# Patient Record
Sex: FEMALE | Race: WHITE
[De-identification: names, ages, dates, MRNs, and addresses within clinical notes are randomized per-mention and may not be internally consistent; named-entity substitution may affect disease eponyms.]

---

## 2017-02-06 ENCOUNTER — HOSPITAL ENCOUNTER (OUTPATIENT)
Dept: HOSPITAL 39 - GMAM | Age: 27
Discharge: HOME | End: 2017-02-06
Attending: FAMILY MEDICINE
Payer: COMMERCIAL

## 2017-02-06 DIAGNOSIS — F41.1: ICD-10-CM

## 2017-02-06 DIAGNOSIS — R53.83: Primary | ICD-10-CM

## 2020-03-09 ENCOUNTER — HOSPITAL ENCOUNTER (EMERGENCY)
Dept: HOSPITAL 39 - ER | Age: 30
Discharge: HOME | End: 2020-03-09
Payer: COMMERCIAL

## 2020-03-09 VITALS — DIASTOLIC BLOOD PRESSURE: 78 MMHG | SYSTOLIC BLOOD PRESSURE: 104 MMHG | TEMPERATURE: 97.6 F

## 2020-03-09 VITALS — OXYGEN SATURATION: 98 %

## 2020-03-09 DIAGNOSIS — Z79.899: ICD-10-CM

## 2020-03-09 DIAGNOSIS — Z87.440: ICD-10-CM

## 2020-03-09 DIAGNOSIS — R11.2: ICD-10-CM

## 2020-03-09 DIAGNOSIS — Z87.442: ICD-10-CM

## 2020-03-09 DIAGNOSIS — N13.2: Primary | ICD-10-CM

## 2020-03-09 DIAGNOSIS — D25.9: ICD-10-CM

## 2020-03-09 PROCEDURE — 83605 ASSAY OF LACTIC ACID: CPT

## 2020-03-09 PROCEDURE — 85025 COMPLETE CBC W/AUTO DIFF WBC: CPT

## 2020-03-09 PROCEDURE — 76830 TRANSVAGINAL US NON-OB: CPT

## 2020-03-09 PROCEDURE — 83690 ASSAY OF LIPASE: CPT

## 2020-03-09 PROCEDURE — 80076 HEPATIC FUNCTION PANEL: CPT

## 2020-03-09 PROCEDURE — 80048 BASIC METABOLIC PNL TOTAL CA: CPT

## 2020-03-09 PROCEDURE — 84703 CHORIONIC GONADOTROPIN ASSAY: CPT

## 2020-03-09 PROCEDURE — 82150 ASSAY OF AMYLASE: CPT

## 2020-03-09 PROCEDURE — 74176 CT ABD & PELVIS W/O CONTRAST: CPT

## 2020-03-09 PROCEDURE — 87040 BLOOD CULTURE FOR BACTERIA: CPT

## 2020-03-09 PROCEDURE — 36415 COLL VENOUS BLD VENIPUNCTURE: CPT

## 2020-03-09 PROCEDURE — 81001 URINALYSIS AUTO W/SCOPE: CPT

## 2020-03-09 NOTE — US
EXAM DESCRIPTION: 

Pelvis Transvaginal: Ultrasound.



CLINICAL HISTORY: 

30 years Female LLQ pain, pelvic calcifications seen on CT



COMPARISON: 

CT scan abdomen and pelvis without contrast on the same visit.

Right abdominal pain.



TECHNIQUE: 

Endovaginal scanning; Gray-scale and Doppler modes.



FINDINGS: 

Uterus 8.4 x 4.4 x 3.7 cm 71.2 mL.. Endometrial thickness 3.6 mm.

Myometrium heterogeneous. Hypoechoic mass with poorly defined

walls anterior myometrial wall subcapsular measuring 10 x 9 x 7

mm. Cyst at the second hyperechoic mass slightly more inferior

also subcapsular to the anterior uterine wall measuring 10 x 8 x

7 mm.. Uterus  not retroflexed. Cervix unremarkable. Cul-de-sac

minimal fluid.

Right ovary 2.7 x 1.8 x 1.5 cm 3.8 mL.. Normal color and waveform

Doppler vascularity. Small follicles but no cysts. No adnexal

mass or free fluid. 

Left ovary 2.3 x 1.8 x 1.2 cm 2.6 mL.. Normal waveform and color

Doppler vascularity. Multiple follicles but no cysts. No adnexal

mass or free fluid.



IMPRESSION: 

1. Uterus not enlarged with no endometrial thickening. At least 3

fibroids under 2 cm in greatest diameter. No endometrial

thickening or fluid. Cervix unremarkable. Minimal fluid in the

cul-de-sac.

2. Bilateral ovaries normal size with follicles but no dominant

cysts. No adnexal masses.



Electronically signed by:  Luis Chadwick MD  3/9/2020 2:02 PM CDT

Workstation: [id number]

## 2020-03-09 NOTE — ED.PDOC
History of Present Illness





- General


Time Seen by Provider: 03/09/20 09:10


Source: patient





- History of Present Illness


Initial Comments: 





29 yo female with PMH of kidney stones who presents with cc of left flank and 

LLQ pain.  Reports mild dull aching pains to Left flank for a couple days, 

acutely worsened this morning just PTA.  Now reports as sharp severe pain to 

Left flank which radiates to LLQ, constant, no known exacerbating factors, 

nothing taken at home for relief.  LMP was 2 weeks ago.  Denies any fevers, 

chills, diarrhea.  Reports nausea and NBNB emesis upon ED arrival.  Reports 

recent frequent urination but no burning or dysuria.  Has had hx of UTI's in the

past and kidney infections.





Allergies/Adverse Reactions: 


Allergies





NO KNOWN ALLERGY Allergy (Verified 03/09/20 09:47)


   





Home Medications: 


Ambulatory Orders





Norethindrone (Contraceptive) [Domonique] 0.35 mg PO DAILY #3 tab 06/18/14 


RX: Ferrous Sulfate [Feosol Tab] 325 mg PO DAILY #100 tab 06/18/14 


RX: HYDROcodone 5MG/APAP 325MG [Norco 5/325] 1 ea PO Q8H PRN #12 tab 06/18/14 


RX: Ibuprofen [Motrin] 600 mg PO Q8H PRN #22 tab 06/18/14 


Acetamin W/Cod #3 Tab [Tylenol w/CODEINE #3] 1 ea PO Q6H PRN 10 Days #20 tab 

03/09/20 


Ondansetron Odt [Zofran ODT] 8 mg PO Q8H PRN 5 Days #10 tab 03/09/20 


Tamsulosin [Flomax] 0.4 mg PO DAILY 7 Days #7 cap 03/09/20 











Review of Systems





- Review of Systems


Review of Systems: 





03/09/20 09:19


as per HPI





All other Systems: Reviewed and Negative





Past Medical History (General)





- Patient Medical History


Hx Seizures: No


Hx Stroke: No


Hx Dementia: No


Hx Asthma: No


Hx of COPD: No


Hx Cardiac Disorders: No


Hx Congestive Heart Failure: No


Hx Pacemaker: No


Hx Hypertension: No


Hx Thyroid Disease: No


Hx Diabetes: No


Hx Gastroesophageal Reflux: No


Hx Renal Disease: No


Hx Cancer: No


Hx of HIV: No


Hx Hepatitis C: No


Hx MRSA: No





- Vaccination History


Hx Tetanus, Diphtheria Vaccination: No


Hx Influenza Vaccination: No


Hx Pneumococcal Vaccination: No





- Social History


Hx Tobacco Use: No


Hx Alcohol Use: No


Hx Substance Use: No


Hx Substance Use Treatment: No


Hx Depression: No





- Female History


Hx Last Menstrual Period: 09/01/13


Patient Pregnant: No


Expected Date of Delivery:: 06/20/14





Family Medical History





- Family History


  ** Mother


Family History: No Known





Physical Exam





- Physical Exam


General Appearance: Alert, Other - appears in distress from pain


Eye Exam: bilateral normal


Ears, Nose, Throat: hearing grossly normal, normal ENT inspection


Neck: non-tender, full range of motion, supple, normal inspection


Respiratory: lungs clear, normal breath sounds, no respiratory distress, no 

accessory muscle use


Cardiovascular/Chest: normal peripheral pulses, regular rate, rhythm, no edema, 

no gallop, no JVD, no murmur


Peripheral Pulses: radial,right: 2+, radial,left: 2+


Gastrointestinal/Abdominal: soft, tenderness - moderate to LLQ, no noted CVA 

tenderness


Extremity: normal range of motion, non-tender, normal inspection, no pedal 

edema, no calf tenderness, normal capillary refill


Neurologic: CNs II-XII nml as tested, no motor/sensory deficits, alert, normal 

mood/affect, oriented x 3


Skin Exam: normal color, warm/dry





Progress





- Progress


Progress: 





03/09/20 09:20


Left flank and LLQ pain


-consider kidney stone vs UTI/pyelo most likely.  Consider also ectopic 

pregnancy, ovarian torsion, ovarian cyst, constipation, appendicitis, 

pancreatitis, colitis, mesenteric adenitis, other


-obtain labs, UA, hcg


-place PIV, 1 L NS bolus, Toradol 30 mg IV, morphine 5 mg IV, Zofran 4 mg IV





03/09/20 12:08


-Labs show UA with 20-30 RBC, 0-1 WBC, rare bacteria - susp for kidney stone.  

WBC wnl, Cr 1.1, lactate 2.8.  HCG negative.


-CT A/P reveals moderate BL hydronephrosis and nonobstructing stone in L kidney 

but no ureteral stones.  Suspect possibly very small stone missed by CT vs 

passed stone.  Consider also ovarian cyst vs torsion still possible.


-Pt still reports moderate pain in ED.  Will obtain TVUS and repeat lactate.





03/10/20 15:00


-Repeat lactate improved to 1.3.  TVUS showed normal appearing ovaries and 2 <2 

cm fibroids in the uterus.  Otherwise unremarkable.


-Pt has remained stable in ED, pain markedly improved.  Suspect likely ureteral 

stone which has either been missed on CT imaging or has been passed.  Discussed 

with pt - will give Rx of Flomax, Tylenol #3 PRN, Zofran PRN.  Dc home in good 

condition, return warnings discussed at length.  F/u closely with PCP.





Paco Spangler MD


Billing #[id number]








                                        





03/09/20 09:14


IV Care:Saline Lock per Protoc QSHIFT 





03/09/20 12:15


BLOOD CULTURE Stat 








                         Laboratory Results - last 24 hr











  03/09/20 03/09/20 03/09/20





  09:23 09:23 09:23


 


WBC  8.1  


 


RBC  4.20  


 


Hgb  13.1  


 


Hct  38.2  


 


MCV  90.9  


 


MCH  31.2 H  


 


MCHC  34.3  


 


RDW  12.7  


 


Plt Count  257  


 


MPV  9.6  


 


Absolute Neuts (auto)  3.90  


 


Absolute Lymphs (auto)  3.40  


 


Absolute Monos (auto)  0.60  


 


Absolute Eos (auto)  0.10  


 


Absolute Basos (auto)  0.10  


 


Neutrophils %  48.7  


 


Lymphocytes %  42.3  


 


Monocytes %  6.9  


 


Eosinophils %  1.2  


 


Basophils %  0.9  


 


Sodium   140 


 


Potassium   3.6 


 


Chloride   105 


 


Carbon Dioxide   22 


 


Anion Gap   16.6 


 


BUN   14 


 


Creatinine   1.14 


 


BUN/Creatinine Ratio   12.3 


 


Random Glucose   154 H 


 


Serum Osmolality   283.0 


 


Lactic Acid    2.9 H*


 


Calcium   9.2 


 


Total Bilirubin   0.6 


 


Direct Bilirubin   0.1 


 


Indirect Bilirubin   0.5 


 


AST   43 H 


 


ALT   42 


 


Alkaline Phosphatase   31 L 


 


Serum Total Protein   6.8 


 


Albumin   4.1 


 


Amylase   61 


 


Lipase   35 


 


Serum HCG, Qual   


 


Urine Color   


 


Urine Appearance   


 


Urine pH   


 


Ur Specific Gravity   


 


Urine Protein   


 


Urine Glucose (UA)   


 


Urine Ketones   


 


Urine Blood   


 


Urine Nitrite   


 


Urine Bilirubin   


 


Urine Urobilinogen   


 


Ur Leukocyte Esterase   


 


Urine RBC   


 


Urine WBC   


 


Ur Epithelial Cells   


 


Amorphous Sediment   


 


Urine Bacteria   


 


Urine HCG, Qual   














  03/09/20 03/09/20 03/09/20





  09:23 10:25 12:15


 


WBC   


 


RBC   


 


Hgb   


 


Hct   


 


MCV   


 


MCH   


 


MCHC   


 


RDW   


 


Plt Count   


 


MPV   


 


Absolute Neuts (auto)   


 


Absolute Lymphs (auto)   


 


Absolute Monos (auto)   


 


Absolute Eos (auto)   


 


Absolute Basos (auto)   


 


Neutrophils %   


 


Lymphocytes %   


 


Monocytes %   


 


Eosinophils %   


 


Basophils %   


 


Sodium   


 


Potassium   


 


Chloride   


 


Carbon Dioxide   


 


Anion Gap   


 


BUN   


 


Creatinine   


 


BUN/Creatinine Ratio   


 


Random Glucose   


 


Serum Osmolality   


 


Lactic Acid    1.3


 


Calcium   


 


Total Bilirubin   


 


Direct Bilirubin   


 


Indirect Bilirubin   


 


AST   


 


ALT   


 


Alkaline Phosphatase   


 


Serum Total Protein   


 


Albumin   


 


Amylase   


 


Lipase   


 


Serum HCG, Qual  Negative  


 


Urine Color   Yellow 


 


Urine Appearance   Sl cloudy 


 


Urine pH   7.0 


 


Ur Specific Gravity   1.025 


 


Urine Protein   Negative 


 


Urine Glucose (UA)   Negative 


 


Urine Ketones   Trace 


 


Urine Blood   Moderate H 


 


Urine Nitrite   Negative 


 


Urine Bilirubin   Negative 


 


Urine Urobilinogen   0.2 


 


Ur Leukocyte Esterase   Negative 


 


Urine RBC   20-30 H 


 


Urine WBC   0-1 


 


Ur Epithelial Cells   5-10 


 


Amorphous Sediment   1+ 


 


Urine Bacteria   Rare 


 


Urine HCG, Qual  Cancelled  

















Departure





- Departure


Clinical Impression: 


 Kidney stone on left side, Fibroid uterus





Time of Disposition: 14:14


Disposition: Discharge to Home or Self Care


Condition: Fair


Departure Forms:  ED Discharge - Pt. Copy, Patient Portal Self Enrollment


Instructions:  Kidney Stones (DC), Uterine Fibroids (DC)


Diet: resume usual diet


Referrals: 


Jairo Bedolla MD [Primary Care Provider] - 1-2 Weeks


Prescriptions: 


Acetamin W/Cod #3 Tab [Tylenol w/CODEINE #3] 1 ea PO Q6H PRN 10 Days #20 tab


 PRN Reason: Pain


Ondansetron Odt [Zofran ODT] 8 mg PO Q8H PRN 5 Days #10 tab


 PRN Reason: Nausea


Tamsulosin [Flomax] 0.4 mg PO DAILY 7 Days #7 cap


Home Medications: 


Ambulatory Orders





Norethindrone (Contraceptive) [Domonique] 0.35 mg PO DAILY #3 tab 06/18/14 


RX: Ferrous Sulfate [Feosol Tab] 325 mg PO DAILY #100 tab 06/18/14 


RX: HYDROcodone 5MG/APAP 325MG [Norco 5/325] 1 ea PO Q8H PRN #12 tab 06/18/14 


RX: Ibuprofen [Motrin] 600 mg PO Q8H PRN #22 tab 06/18/14 


Acetamin W/Cod #3 Tab [Tylenol w/CODEINE #3] 1 ea PO Q6H PRN 10 Days #20 tab 

03/09/20 


Ondansetron Odt [Zofran ODT] 8 mg PO Q8H PRN 5 Days #10 tab 03/09/20 


Tamsulosin [Flomax] 0.4 mg PO DAILY 7 Days #7 cap 03/09/20 








Additional Instructions: 


Return if symptoms worsen or other concerning symptoms develop.  Follow up 

closely with your primary care doctor in next 1-2 weeks for repeat evaluation 

and repeat urine testing as outpatient to ensure clearing of blood in the urine.

## 2020-03-09 NOTE — CT
EXAM DESCRIPTION: 

Abdoment/Pelvis w/o Contrast: Computed Tomography.



CLINICAL HISTORY: 

acute left flank and LLQ pain, hx of kidney stones



COMPARISON: 

None.



TECHNIQUE: 

Spiral-axial scans at 2.5 x 2.5 mm intervals PRONE through the

abdomen and pelvis. 2.0 mm reconstructions. No IV or oral

contrast.  Total Exam DLP: 427 mGy-cm.  This exam was performed

according to our departmental CT dose-optimization program which

includes automated exposure control, adjustment of the mA and/or

kV according to patient size and/or use of iterative

reconstruction technique; to reduce radiation dose to as low as

reasonably achievable (ALARA). 



FINDINGS:

Kidneys and Ureters: 3.6 mm radiodense stone in the upper

collecting system of the right kidney. Tiny radiodense material

in the inferior collecting system. No hydronephrosis. Large

extrarenal pelvis left kidney, but no radiodense stones in the

pelvis. No radiodense stones in the left kidney, no

hydronephrosis, and no radiodense stones or hydroureter on the

left.



Pelvic Organs: No radiodense stones in the urinary bladder and no

fluid/fluid level. No wall thickening. Uterus is anteverted.

Ovaries not well seen. Minimal fluid in the cul-de-sac. Numerous

calcifications in the adnexa abutting the cervix and lower

uterine segment.



Lung and pleura bases: Negative.



Liver, spleen, stomach, and adrenal glands: Right lobe of the

liver 21.6 cm in the long axis. Normal density. Stomach and solid

organs are negative.



Pancreas, Gallbladder, Ducts: Unremarkable.



Aorta: Negative.



Small Bowel: Unremarkable.



Terminal Ileum/Cecum: Containing fecal material. Appendix not

well seen. Normal caliber.  



Colon: Decompressed transverse descending and sigmoid colon.



Mesentery: No fatty stranding fascial thickening free air free

fluid.



Spine and Bony Pelvis: Negative.



Abdominal Wall/Back Soft Tissues: Unremarkable.



IMPRESSION: 

1. Less than 4 mm radiodense stone nonobstructing in the upper

pole of the right kidney. No hydronephrosis, no ureteral

radiodense stones are hydroureter bilaterally. Prominent left

extrarenal pelvis does not contain stones. No radiodense stones

in the urinary bladder.

2. Minimal fluid in the cul-de-sac. Uterus is anteverted.

Numerous calcifications bilateral adnexa. Ovaries not well

visualized.

3. Hepatomegaly with normal density. Correlate with clinical

findings and liver function tests.

 



Electronically signed by:  Luis Chadwick MD  3/9/2020 11:21 AM CDT

Workstation: [id number]